# Patient Record
Sex: FEMALE | Race: OTHER
[De-identification: names, ages, dates, MRNs, and addresses within clinical notes are randomized per-mention and may not be internally consistent; named-entity substitution may affect disease eponyms.]

---

## 2018-12-07 ENCOUNTER — HOSPITAL ENCOUNTER (EMERGENCY)
Dept: HOSPITAL 25 - ED | Age: 63
Discharge: HOME | End: 2018-12-07
Payer: COMMERCIAL

## 2018-12-07 VITALS — SYSTOLIC BLOOD PRESSURE: 107 MMHG | DIASTOLIC BLOOD PRESSURE: 65 MMHG

## 2018-12-07 DIAGNOSIS — H81.10: Primary | ICD-10-CM

## 2018-12-07 DIAGNOSIS — R00.1: ICD-10-CM

## 2018-12-07 DIAGNOSIS — Z88.0: ICD-10-CM

## 2018-12-07 DIAGNOSIS — R11.10: ICD-10-CM

## 2018-12-07 LAB
BASOPHILS # BLD AUTO: 0.1 10^3/UL (ref 0–0.2)
EOSINOPHIL # BLD AUTO: 0 10^3/UL (ref 0–0.6)
HCT VFR BLD AUTO: 40 % (ref 35–47)
HGB BLD-MCNC: 13.1 G/DL (ref 12–16)
INR PPP/BLD: 1.03 (ref 0.77–1.02)
LYMPHOCYTES # BLD AUTO: 0.9 10^3/UL (ref 1–4.8)
MCH RBC QN AUTO: 29 PG (ref 27–31)
MCHC RBC AUTO-ENTMCNC: 33 G/DL (ref 31–36)
MCV RBC AUTO: 89 FL (ref 80–97)
MONOCYTES # BLD AUTO: 0.3 10^3/UL (ref 0–0.8)
NEUTROPHILS # BLD AUTO: 6 10^3/UL (ref 1.5–7.7)
NRBC # BLD AUTO: 0 10^3/UL
NRBC BLD QL AUTO: 0
PLATELET # BLD AUTO: 123 10^3/UL (ref 150–450)
RBC # BLD AUTO: 4.5 10^6/UL (ref 4–5.4)
WBC # BLD AUTO: 7.3 10^3/UL (ref 3.5–10.8)

## 2018-12-07 PROCEDURE — 81003 URINALYSIS AUTO W/O SCOPE: CPT

## 2018-12-07 PROCEDURE — 83735 ASSAY OF MAGNESIUM: CPT

## 2018-12-07 PROCEDURE — 82150 ASSAY OF AMYLASE: CPT

## 2018-12-07 PROCEDURE — 85610 PROTHROMBIN TIME: CPT

## 2018-12-07 PROCEDURE — 99284 EMERGENCY DEPT VISIT MOD MDM: CPT

## 2018-12-07 PROCEDURE — 70450 CT HEAD/BRAIN W/O DYE: CPT

## 2018-12-07 PROCEDURE — 71045 X-RAY EXAM CHEST 1 VIEW: CPT

## 2018-12-07 PROCEDURE — 85025 COMPLETE CBC W/AUTO DIFF WBC: CPT

## 2018-12-07 PROCEDURE — 93005 ELECTROCARDIOGRAM TRACING: CPT

## 2018-12-07 PROCEDURE — 86140 C-REACTIVE PROTEIN: CPT

## 2018-12-07 PROCEDURE — 83690 ASSAY OF LIPASE: CPT

## 2018-12-07 PROCEDURE — 36415 COLL VENOUS BLD VENIPUNCTURE: CPT

## 2018-12-07 PROCEDURE — 70498 CT ANGIOGRAPHY NECK: CPT

## 2018-12-07 PROCEDURE — 85730 THROMBOPLASTIN TIME PARTIAL: CPT

## 2018-12-07 PROCEDURE — 96360 HYDRATION IV INFUSION INIT: CPT

## 2018-12-07 PROCEDURE — 80053 COMPREHEN METABOLIC PANEL: CPT

## 2018-12-07 PROCEDURE — 70496 CT ANGIOGRAPHY HEAD: CPT

## 2018-12-07 NOTE — ED
Dizziness





- HPI Summary


HPI Summary: 


This patient is a 63 year old F brought in by ambulance to [facility] accompanied by 

son status post fall. The patient rates the pain 0/10 in severity. Symptoms 

aggravated by nothing. Symptoms alleviated by nothing. Patient reports vomiting 

and dizziness (room spinning).  








- History Of Current Complaint


Chief Complaint: EDDizziness


Stated Complaint: DIZZINESS


Time Seen by Provider: 12/07/18 02:54


Hx Obtained From: Patient


Onset/Duration: Still Present


Timing: Constant


Severity Initially: Mild


Severity Currently: Mild


Character: Room Spinning


Aggravating Factor(s): Nothing


Alleviating Factor(s): Nothing


Associated Signs And Symptoms: Positive: Vomiting





- Allergies/Home Medications


Allergies/Adverse Reactions: 


 Allergies











Allergy/AdvReac Type Severity Reaction Status Date / Time


 


Penicillins Allergy  Unknown Verified 10/19/18 14:24





   Reaction  





   Details  











Home Medications: 


 Home Medications





Acetaminophen [Tylenol] 325 mg PO Q4HR PRN 12/07/18 [History Confirmed 12/07/18]


Cetirizine* [ZyrTEC 10 MG TAB*] 10 mg PO DAILY 12/07/18 [History Confirmed 12/07 /18]


Pravastatin Sodium 20 mg PO DAILY 12/07/18 [History Confirmed 12/07/18]











PMH/Surg Hx/FS Hx/Imm Hx


Previously Healthy: No


Endocrine/Hematology History: Reports: Hx Diabetes


Cardiovascular History: Reports: Hx Hypertension





- Cancer History


Hx Chemotherapy: No


Hx Radiation Therapy: No


Infectious Disease History: No


Infectious Disease History: 


   Denies: Traveled Outside the [place] in Last 30 Days





- Family History


Known Family History: Positive: Other - Negative breast CA





- Social History


Occupation: Disabled


Lives: With Family


Alcohol Use: None


Hx Substance Use: No


Substance Use Type: Reports: None


Hx Tobacco Use: No


Smoking Status (MU): Never Smoked Tobacco





Review of Systems


Positive: Vomiting


Neurological: Other - Positive dizziness


All Other Systems Reviewed And Are Negative: Yes





Physical Exam





- Summary


Physical Exam Summary: 


VITAL SIGNS: Reviewed.


GENERAL: Patient is a well-developed and nourished female who is lying 

comfortable in the stretcher. Patient is not in any acute respiratory distress.


HEAD AND FACE: No signs of trauma. No ecchymosis, hematomas or skull 

depressions. No sinus tenderness.


EYES: PERRLA, EOMI x 2, No injected conjunctiva, no nystagmus.


EARS: Hearing grossly intact. Ear canals and tympanic membranes are within 

normal limits.


MOUTH: Oropharynx within normal limits.


NECK: Supple, trachea is midline, no adenopathy, no JVD, no carotid bruit, no c-

spine tenderness, neck with full ROM.


CHEST: Symmetric, no tenderness at palpation


LUNGS: Clear to auscultation bilaterally. No wheezing or crackles.


CVS: Regular rate and rhythm, S1 and S2 present, no murmurs or gallops 

appreciated.


ABDOMEN: Soft, non-tender. No signs of distention. No rebound no guarding, and 

no masses palpated. Bowel sounds are normal.


EXTREMITIES: FROM in all major joints, no edema, no cyanosis or clubbing.


NEURO: Alert and oriented x 3. No acute neurological deficits. Speech is normal 

and follows commands. pt refused to walk; however, her exam in the stretcher 

was intact. Finger to nose coordination wise is intact. Left facial droop, 

which is old.


SKIN: Dry and warm





Triage Information Reviewed: Yes


Vital Signs On Initial Exam: 


 Initial Vitals











Pulse BP Pulse Ox


 


 60   145/81   93 


 


 12/07/18 03:01  12/07/18 03:01  12/07/18 03:01











Vital Signs Reviewed: Yes





- Sin Coma Scale


Best Eye Response: 4 - Spontaneous


Best Motor Response: 6 - Obeys Commands


Best Verbal Response: 5 - Oriented


Coma Scale Total: 15





Diagnostics





- Vital Signs


 Vital Signs











  Temp Pulse Resp BP Pulse Ox


 


 12/07/18 03:09  97.5 F  56  16  145/81  95


 


 12/07/18 03:01   60   145/81  93














- Laboratory


Result Diagrams: 


 12/07/18 03:41





 12/07/18 03:41


Lab Statement: Any lab studies that have been ordered have been reviewed, and 

results considered in the medical decision making process.





- Radiology


  ** Chest XR


Radiology Interpretation Completed By: ED Physician


Summary of Radiographic Findings: CXR reveals, per ED physician, no acute 

process.





- CT


  ** Head CTA


CT Interpretation Completed By: Radiologist


Summary of CT Findings: Head CTA reveals, per radiologist, 1. No acute 

findings. No evidence of hemodynamically significant stenosis. 2. Corrugated 

appearance to a long segment of the bilateral mid and distal internal carotid 

arteries, suggestive of fibromuscular dysplasia.  Differential includes 

vasculitis. ED physician has reviewed this radiology report.





  ** Brain CT


CT Interpretation Completed By: Radiologist


Summary of CT Findings: Brain CT reveals, per radiologist, no acute 

intracranial abnormality. ED physician has reviewed this radiology report.





- EKG


  ** 0310


Cardiac Rate: NL


EKG Rhythm: Sinus Rhythm - 58 BPM


ST Segment: Non-Specific


Summary of EKG Findings: An EKG taken at 0310 reveals nml sinus rhythm at 58 

BPM with nonspecific T wave changes in the inferior leads.





Re-Evaluation





- Re-Evaluation


  ** First Eval


Re-Evaluation Time: 05:35


Change: Improved


Comment: Pt reports she feels better. She is able to sit up in the stretcher 

with mild dizziness





Dizzy Course/Dx





- Course


Course Of Treatment: This patient is a 63 year old F brought in by ambulance to 

[facility] accompanied by son status post fall. The patient rates the pain 0/10 in 

severity. Physical Exam Findings: pt refused to walk; however, her neuro exam 

in the stretcher was intact. Finger to nose coordination wise is intact. No 

nystagmus. Left facial droop, which is old. An EKG taken at 0310 reveals nml 

sinus rhythm at 58 BPM with nonspecific T wave changes in the inferior leads. 

CXR reveals, per ED physician, no acute process. Head CTA reveals, per 

radiologist, 1. No acute findings. No evidence of hemodynamically significant 

stenosis. 2. Corrugated appearance to a long segment of the bilateral mid and 

distal internal carotid arteries, suggestive of fibromuscular dysplasia.  

Differential includes vasculitis.  Brain CT reveals, per radiologist, no acute 

intracranial abnormality. Bloodwork and UA obtained. In the ED course the 

patient was given contrast, fluids, Zofran, antivert, and potassium. Pt most 

likely has benign positional vertigo. Patient will be discharged with 

prescription for Meclizine and follow up from PCP. The patient is agreeable 

with this plan.





- Diagnoses


Provider Diagnoses: 


 Benign positional vertigo








Discharge





- Sign-Out/Discharge


Documenting (check all that apply): Patient Departure - Discharge home





- Discharge Plan


Condition: Stable


Disposition: HOME


Prescriptions: 


Meclizine HCl [Motion Sickness Relief] 25 mg PO TID PRN #20 tablet


 PRN Reason: Dizziness


Patient Education Materials:  Vertigo (ED)


Referrals: 


Gabriel Patton MD [Primary Care Provider] - 2 Days


Additional Instructions: 


RETURN TO THE EMERGENCY DEPARTMENT FOR NEW OR WORSENING SYMPTOMS





- Attestation Statements


Document Initiated by Scribe: Yes


Documenting Scribe: Tahmina Goff


Provider For Whom Scribe is Documenting (Include Credential): Dr. Tex Trujillo MD


Scribe Attestation: 


I, Tahmina Goff, scribed for Dr. Tex Trujillo MD on 12/07/18 at 0610. 


Status of Scribe Document: Ready

## 2018-12-07 NOTE — XMS REPORT
Continuity of Care Document (CCD)

 Created on:2018



Patient:Erinn Glover

Sex:Female

:1955

External Reference #:[id number]





Demographics







 Address  [address] [place] [address]

 

 Home Phone  [phone]

 

 Mobile Phone  [phone]

 

 Preferred Language  es

 

 Marital Status   or 

 

 Caodaism Affiliation  Unknown

 

 Race  Unknown

 

 Additional Race(s)  Other Race

 

 Ethnic Group   or 









Author







 Name  Gabriel Patton MD

 

 Address  [address] [place] [address]









Support







 Name  Relationship  Address  Phone

 

 León Glover  Son  Unavailable  [phone]

 

 Daniel Glover  Son  Unavailable  [phone]









Care Team Providers







 Name  Role  Phone

 

 Gabriel Patton MD  Care Team Information   Unavailable









Payers







 Type  Date  Identification Numbers  Payment Provider  Subscriber

 

   Expires:  Policy Number:  [employer]  Seciliabet Gautam



   2016  [id number]    









 PayID: [id number]  [address] [address]









 [place] [address]









   Effective:  Policy Number:  Medicaid/[employer]  Seciliabet Gautam



   2017  [id number]    









 Expires: 2017  Group Name: 1 1  [address]









 PayID: [id number]  [place] [address]









     Policy Number: [id number]  [facility]  Seciliabet Gautam









 PayID: [id number]  [address] [place] [address]







Advance Directives







 Description

 

 No Information Available







Problems







 Description

 

 No Information







Family History







 Date  Family Member(s)  Problem(s)  Comments

 

   General  Diabetes  







Social History







 Type  Date  Description  Comments

 

 Birth Sex    Unknown  

 

 Marital Status      Earlier this year

 

 Lives With    Sons  

 

 Occupation    Disabled  

 

 Tobacco Use  Start: Unknown  Never Smoked Cigarettes  

 

 ETOH Use    Denies alcohol use  

 

 Recreational Drug Use    Never Used Drugs  

 

 Tobacco Use  Start: Unknown  Patient has never smoked  

 

 Smoking Status  Reviewed: 18  Patient has never smoked  







Allergies, Adverse Reactions, Alerts







 Date  Description  Reaction  Status  Severity  Comments

 

 2017  Penicillin    Active    

 

 2017  NKDA    Inactive    







Medications







 Medication  Date  Status  Form  Strength  Qnty  SIG  Indications  Ordering



                 Provider

 

 Freestyle  /  Active  Misc    120un  use 2 times a    Gabriel



 Lancets  2018        its  day as    Pooja



             instructed    , MD



             for blood    



             glucose    



             testing    

 

 Freestyle Lite  /  Active  Strips    200un  use to test    Gabriel



 Test  2018        its  blood sugar    Pooja



             twice daily    , MD



             dx code e11.9    

 

 Alcohol Swabs  /  Active  Pads  70%  100un  use for    Gabriel



   2018        its  fingersticks    MD Pooja

 

 Vitamin B12  /  Active  Tablets  3000mcg  90tab  1 tab    Gabriel



   2018    Sub    s  sublingually    Heetderks



             per day    , MD

 

 Glucometer  /  Active        For testing  E11.65  Gabriel



   2018          blood sugar.    Pooja Mendoza MD



             controlled    



             diabetes.    



             Please    



             dispense    



             glucometer    



             kit covered    



             by insurance.    

 

 Ramipril  /  Active  Capsules  10mg  30cap  take 1  Z00.8  Gabriel



           s  capsule by    Heetsohan



             mouth once    , MD



             daily    

 

 Zyrtec Allergy  /  Active  Tablets  10mg  30tab  1 by mouth  R05  Gabriel



           s  every day    MD Pooja

 

 Calcium/D  /  Active      60uni  Take 1 Tablet    Gabriel



 500MG Tab  2016        ts  By Mouth    Lemueletderks



             Twice Daily    , MD

 

 Tylenol  /  Active  Tablets  325mg  60tab  2 tab by    Gabriel



           s  mouth three    Heetderks



             times a day    , MD



             as needed    



             for headache    

 

 Oyster Shell  10/24/  Active  Tablets  500-400mg  60tab  1 tab by    Gabriel



 Calcium + D3  2016      -Unit  s  mouth twice a    Heetderks



             day    , MD

 

 Pravastatin  10/24/  Active  Tablets  20mg  90tab  Take One    Gabriel



 Sodium          s  Tablet By    Heetderks



             Mouth AT    , MD



             Bedtime    

 

 Pantoprazole  10/24/  Active  Tablets DR  40mg  30tab  Take 1 Tablet    Gabriel



 Sodium          s  By Mouth Once    Heetderks



             Daily    , MD

 

 Torsemide  /  Active  Tablets  10mg  30tab  1 by mouth    Gabriel



   0000        s  every day as    Pooja



             needed    , MD

 

 Metformin HCL  /  Active  Tablets  1000mg  180ta  take one    Gabriel



   0000        bs  tablet by    Heetsohan



             mouth twice    , MD



             daily    

 

                 

 

 Ramipril  01/15/  Hx  Capsules  10mg  60cap  mellissa 2  Z00.8  Gabriel



   2018 -        s  capsules by    Pooja



   01/16/          mouth daily    , MD



   2018          for high    



             blood    



             pressure    

 

 Ramipril  /  Hx  Capsules  10mg  60cap  Take Two  Z00.8  Gabriel



   2016 -        s  Capsules By    Pooja



   01/15/          Mouth Once    , MD



             Daily    

 

 Ramipril  10/24/  Hx  Capsules  10mg  30cap  1 tab by  Z00.8  Gabriel



   2016 -        s  mouth daily    Pooja



   /              , MD



                 

 

 Tessalon  10/24/  Hx  Capsules  100mg  45cap  take 1    Gabriel



 Perles  2016 -        s  capsule by    Pooja



   /          mouth 3 times    , MD



             per day as    



             needed for    



             cough    

 

 Oyster Shell  /  Hx            Unknown



 Calcium 500+D  0000 -              



   10/24/              



   2016              







Medications Administered in Office







 Medication  Date  Status  Form  Strength  Qnty  SIG  Indications  Ordering



                 Provider

 

 Vitamin B-12  08/10/2  Administered  Injection          Lab and



 Injection-To  018              Office



 1000mcg                Services

 

 Vitamin B-12    Administered  Injection          Lab and



 Injection-To  018              Office



 1000mcg                Services

 

 Vitamin B-12    Administered  Injection          Lab and



 Injection-To  018              Office



 1000mcg                Services

 

 Vitamin B-12    Administered  Injection          Gabriel



 Injection-To  azam Landrum MD

 

 Vitamin B-12    Administered  Injection          Lab and



 Injection-To  017              Office



 1000mcg                Services

 

 Vitamin B-12    Administered  Injection          Gabriel



 Injection-To  azam Vila MD

 

 Vitamin B-12  10/24/2  Administered  Injection          Gabriel



 Injection-To  azam Vila MD







Immunizations







 CPT Code  Status  Date  Vaccine  Lot #

 

 [id number]  Given  2018  Influenza Virus Vaccine, Quadrivalent, 3 Yr >  
[id number]



       Quad, Preserv Free  

 

 29684  Given  2017  Influenza Virus Vaccine, Quadrivalent, 3 Yr >  
[id number]



       Quad, Preserv Free  

 

 [id number]  Given  2016  Tdap (Adacel)  [id number]

 

 10057  Given  10/24/2016  Influenza Virus Vaccine, Quadrivalent, 3 Yr >  
[id number]



       Quad, Preserv Free  

 

 93025  Given  10/07/2015  Influenza Virus Vaccine, Quadrivalent, 3 Yr >  



       Quad, Preserv Free  

 

 08906  Given  2008  Pneumovax 23 (PPSV23) 65+ years or high risk 2 to  



       64 year old  

 

 25153  Given  2008  DT (Pediatric)  







Vital Signs







 Date  Vital  Result  Comment

 

 2018  2:36pm  Weight  166.00 lb  









 Weight  75.298 kg  

 

 BP Systolic  124 mmHg  

 

 BP Diastolic  80 mmHg  

 

 Heart Rate  76 /min  

 

 Body Temperature  97.9 F  

 

 Respiratory Rate  16 /min  

 

 Height  63.5 inches  5'3.50"

 

 BMI (Body Mass Index)  28.9 kg/m2  

 

 O2 % BldC Oximetry  96 %  









 2018  4:21pm  Weight  171.00 lb  









 Weight  77.566 kg  

 

 BP Systolic  138 mmHg  

 

 BP Diastolic  85 mmHg  

 

 Heart Rate  64 /min  

 

 Body Temperature  98.7 F  

 

 Respiratory Rate  20 /min  









 2018  1:11pm  Weight  178.00 lb  









 Weight  80.741 kg  

 

 BP Systolic  126 mmHg  

 

 BP Diastolic  78 mmHg  

 

 Heart Rate  74 /min  

 

 Body Temperature  97.9 F  

 

 Respiratory Rate  18 /min  

 

 O2 % BldC Oximetry  97 %  









 2018 11:43am  Weight  179.00 lb  









 Weight  81.194 kg  

 

 BP Systolic  118 mmHg  

 

 BP Diastolic  80 mmHg  

 

 Heart Rate  74 /min  

 

 Body Temperature  97.9 F  

 

 Respiratory Rate  14 /min  

 

 O2 % BldC Oximetry  98 %  









 2017  3:03pm  Weight  175.00 lb  









 Weight  79.380 kg  

 

 BP Systolic  112 mmHg  

 

 BP Diastolic  78 mmHg  

 

 Heart Rate  76 /min  

 

 Body Temperature  98.6 F  

 

 Respiratory Rate  16 /min  









 2017  2:07pm  Weight  172.00 lb  









 Weight  78.019 kg  

 

 BP Systolic  122 mmHg  

 

 BP Diastolic  72 mmHg  

 

 Heart Rate  64 /min  

 

 Body Temperature  97.5 F  

 

 Respiratory Rate  16 /min  









 2016  9:48am  Weight  163.00 lb  









 Weight  73.937 kg  

 

 BP Systolic  140 mmHg  

 

 BP Diastolic  68 mmHg  

 

 Heart Rate  76 /min  

 

 Height  63.5 inches  5'3.50"

 

 BMI (Body Mass Index)  28.4 kg/m2  









 10/24/2016  3:19pm  Weight  161.00 lb  









 Weight  73.030 kg  

 

 BP Systolic  106 mmHg  

 

 BP Diastolic  64 mmHg  

 

 Heart Rate  72 /min  

 

 Body Temperature  97.9 F  

 

 Respiratory Rate  20 /min  

 

 Height  63 inches  5'3"

 

 BMI (Body Mass Index)  28.5 kg/m2  







Results







 Test  Date  Facility  Test  Result  H/L  Range  Note

 

 Laboratory test  2018  [facility]  Hemoglobin A1c  
5.5 %    4.0-5.6  1



 finding    [phone]  (Glyco HGB)        

 

 CBC Auto Diff  2018  [facility]  White Blood  6.8 
10^3/uL    3.5-10.8  



     [phone]  Count        









 Red Blood Count  4.38 10^6/uL    4.00-5.40  

 

 Hemoglobin  12.9 g/dL    12.0-16.0  

 

 Hematocrit  39 %    35-47  

 

 Mean Corpuscular Volume  89 fL    80-97  

 

 Mean Corpuscular Hemoglobin  29 pg    27-31  

 

 Mean Corpuscular HGB Conc  33 g/dL    31-36  

 

 Red Cell Distribution Width  15 %    10.5-15  

 

 Platelet Count  122 10^3/uL  Low  150-450  

 

 Mean Platelet Volume  12.0 fL  High  7.4-10.4  

 

 Abs Neutrophils  3.9 10^3/uL    1.5-7.7  

 

 Abs Lymphocytes  2.3 10^3/uL    1.0-4.8  

 

 Abs Monocytes  0.5 10^3/uL    0-0.8  

 

 Abs Eosinophils  0.1 10^3/uL    0-0.6  

 

 Abs Basophils  0 10^3/uL    0-0.2  

 

 Abs Nucleated RBC  0 10^3/uL      

 

 Granulocyte %  56.9 %      

 

 Lymphocyte %  33.5 %      

 

 Monocyte %  7.3 %      

 

 Eosinophil %  1.6 %      

 

 Basophil %  0.7 %      

 

 Nucleated Red Blood Cells %  0.1      









 Comp Metabolic Panel  2018  [facility]  Sodium  
144 mmol/L    135-145  



     [phone]          









 Potassium  4.3 mmol/L    3.5-5.0  

 

 Chloride  109 mmol/L    101-111  

 

 Co2 Carbon Dioxide  29 mmol/L    22-32  

 

 Anion Gap  6 mmol/L    2-11  

 

 Glucose  86 mg/dL      

 

 Blood Urea Nitrogen  9 mg/dL    6-24  

 

 Creatinine  0.65 mg/dL    0.51-0.95  

 

 BUN/Creatinine Ratio  13.8    8-20  

 

 Calcium  9.9 mg/dL    8.6-10.3  

 

 Total Protein  6.8 g/dL    6.4-8.9  

 

 Albumin  4.1 g/dL    3.2-5.2  

 

 Globulin  2.7 g/dL    2-4  

 

 Albumin/Globulin Ratio  1.5    1-3  

 

 Total Bilirubin  0.40 mg/dL    0.2-1.0  

 

 Alkaline Phosphatase  82 U/L      

 

 Alt  41 U/L    7-52  

 

 Ast  38 U/L    13-39  

 

 Egfr Non-  92.4    >60  

 

 Egfr   111.8    >60  2









 Laboratory test  08/10/2018  [facility]  Vitamin B12  
412 pg/mL    180-914  3



 finding    [phone]          

 

 Liver Function  2018  [facility]  Direct  0.10 mg/
dL    0.03-0.18  



 Panel    [phone]  Bilirubin        









 Indirect Bilirubin  0.4 mg/dL    0.3-1.0  









 Laboratory test  2018  [facility]  Vitamin B12  
182 pg/mL    180-914  4



 finding    [phone]          









 Vitamin D Total 25(Oh)  42.2 ng/mL    20-50  5

 

 TSH (Thyroid Stim Horm)  4.54 mcIU/mL    0.34-5.60  6

 

 Free T4 (Free Thyroxine)  0.87 ng/dL    0.61-1.12  7

 

 T3 Total  1.35 ng/mL    0.87-1.78  8

 

 Thyroperoxidase AB  1.48 IU/mL    <9  9









 Laboratory test  2018  [facility]  Cytology  SEE 
RESULT      10



 finding    [phone]    BELOW      

 

 Comp Metabolic  2018  [facility]  Sodium  144 mmol/
L    139-145  



 Panel    [phone]          









 Potassium  4.0 mmol/L    3.5-5.0  

 

 Chloride  105 mmol/L    101-111  

 

 Co2 Carbon Dioxide  28 mmol/L    22-32  

 

 Anion Gap  11 mmol/L    2-11  

 

 Glucose  88 mg/dL      

 

 Blood Urea Nitrogen  8 mg/dL    6-24  

 

 Creatinine  0.70 mg/dL    0.51-0.95  

 

 BUN/Creatinine Ratio  11.4    8-20  

 

 Calcium  9.5 mg/dL    8.6-10.3  

 

 Total Protein  6.8 g/dL    6.4-8.9  

 

 Albumin  3.9 g/dL    3.2-5.2  

 

 Globulin  2.9 g/dL    2-4  

 

 Albumin/Globulin Ratio  1.3    1-3  

 

 Total Bilirubin  0.50 mg/dL    0.2-1.0  

 

 Alkaline Phosphatase  96 U/L      

 

 Alt  74 U/L  High  7-52  

 

 Ast  64 U/L  High  13-39  

 

 Egfr Non-  85.1    >60  

 

 Egfr   109.4    >60  11









 CBC Auto Diff  2018  [facility]  White Blood  6.2 
10^3/uL    3.5-10.8  



     [phone]  Count        









 Red Blood Count  4.42 10^6/uL    4.0-5.4  

 

 Hemoglobin  12.8 g/dL    12.0-16.0  

 

 Hematocrit  39 %    35-47  

 

 Mean Corpuscular Volume  88 fL    80-97  

 

 Mean Corpuscular Hemoglobin  29 pg    27-31  

 

 Mean Corpuscular HGB Conc  33 g/dL    31-36  

 

 Red Cell Distribution Width  16 %  High  10.5-15  

 

 Platelet Count  119 10^3/uL  Low  150-450  

 

 Mean Platelet Volume  12.1 um3  High  7.4-10.4  

 

 Abs Neutrophils  3.6 10^3/uL    1.5-7.7  

 

 Abs Lymphocytes  2.0 10^3/uL    1.0-4.8  

 

 Abs Monocytes  0.4 10^3/uL    0-0.8  

 

 Abs Eosinophils  0.2 10^3/uL    0-0.6  

 

 Abs Basophils  0 10^3/uL    0-0.2  

 

 Abs Nucleated RBC  0 10^3/uL      

 

 Granulocyte %  58.1 %    38-83  

 

 Lymphocyte %  31.2 %    25-47  

 

 Monocyte %  7.1 %  High  0-7  

 

 Eosinophil %  2.8 %    0-6  

 

 Basophil %  0.8 %    0-2  

 

 Nucleated Red Blood Cells %  0      









 Urine Microalbumin  2018  [facility]  Ur 
Microalbumin  20.4 mg/L      



 Random    [phone]  (mg/L)        









 Urine Creatinine  102.08 mg/dL      

 

 Urine Microalbumin/Creatinine  19.9 ug/mg    <31  









 CBC Auto Diff  2018  [facility]  White Blood  6.3 
10^3/uL    3.5-10.8  



     [phone]  Count        









 Red Blood Count  4.46 10^6/uL    4.0-5.4  

 

 Hemoglobin  12.9 g/dL    12.0-16.0  

 

 Hematocrit  40 %    35-47  

 

 Mean Corpuscular Volume  90 fL    80-97  

 

 Mean Corpuscular Hemoglobin  29 pg    27-31  

 

 Mean Corpuscular HGB Conc  32 g/dL    31-36  

 

 Red Cell Distribution Width  15 %    10.5-15  

 

 Platelet Count  107 10^3/uL  Low  150-450  

 

 Mean Platelet Volume  12.2 um3  High  7.4-10.4  

 

 Abs Neutrophils  3.4 10^3/uL    1.5-7.7  

 

 Abs Lymphocytes  2.2 10^3/uL    1.0-4.8  

 

 Abs Monocytes  0.5 10^3/uL    0-0.8  

 

 Abs Eosinophils  0.1 10^3/uL    0-0.6  

 

 Abs Basophils  0 10^3/uL    0-0.2  

 

 Abs Nucleated RBC  0 10^3/uL      

 

 Granulocyte %  54.4 %    38-83  

 

 Lymphocyte %  35.3 %    25-47  

 

 Monocyte %  7.6 %  High  0-7  

 

 Eosinophil %  2.0 %    0-6  

 

 Basophil %  0.7 %    0-2  

 

 Nucleated Red Blood Cells %  0.1      









 Comp Metabolic Panel  2018  [facility]  Sodium  
142 mmol/L    139-145  



     [phone]          









 Potassium  4.0 mmol/L    3.5-5.0  

 

 Chloride  103 mmol/L    101-111  

 

 Co2 Carbon Dioxide  28 mmol/L    22-32  

 

 Anion Gap  11 mmol/L    2-11  

 

 Glucose  78 mg/dL      

 

 Blood Urea Nitrogen  12 mg/dL    6-24  

 

 Creatinine  0.69 mg/dL    0.51-0.95  

 

 BUN/Creatinine Ratio  17.4    8-20  

 

 Calcium  9.6 mg/dL    8.6-10.3  

 

 Total Protein  6.8 g/dL    6.4-8.9  

 

 Albumin  3.9 g/dL    3.2-5.2  

 

 Globulin  2.9 g/dL    2-4  

 

 Albumin/Globulin Ratio  1.3    1-3  

 

 Total Bilirubin  0.40 mg/dL    0.2-1.0  

 

 Alkaline Phosphatase  92 U/L      

 

 Alt  75 U/L  High  7-52  

 

 Ast  92 U/L  High  13-39  

 

 Egfr Non-  86.5    >60  

 

 Egfr   111.2    >60  12









 Laboratory test  2018  [facility]  Hemoglobin A1c  
5.8 %  High  4.0-5.6  13



 finding    [phone]  (Glyco HGB)        

 

 Lipid Profile  2018  [facility]  Triglycerides  
133      14



 (Trig/Chol/HDL)    [phone]    mg/dL      









 Cholesterol  142 mg/dL      15

 

 HDL Cholesterol  36.6 mg/dL      16

 

 LDL Cholesterol  79 mg/dL      17









 Laboratory test  2017  [facility]  Hemoglobin A1c  
5.8 %    Less than  18



 finding    [phone]  (Glyco HGB)      6.0  









 Hepatitis C Antibody  Nonreactive    Nonreactive  19









 Lipid Profile  2017  [facility]  Triglycerides  
143 mg/dL      20



 (Trig/Chol/HDL)    [phone]          









 Cholesterol  160 mg/dL      21

 

 HDL Cholesterol  38.4 mg/dL      22

 

 LDL Cholesterol  93 mg/dL      23









 Urine Microalbumin  2016  [facility]  Urine 
Creatinine  80.43 mg/dL      24



 Random    [phone]          









 Ur Microalbumin (mg/L)  15.8 mg/L      

 

 Urine Microalbumin/Creatinine  19.6 ug/mg    <31  









 Laboratory test  10/24/2016  [facility]  Hemoglobin A1c  
5.7 %    Less than  25



 finding    [phone]  (Glyco HGB)      6.0  

 

 CBC Auto Diff  10/24/2016  [facility]  White Blood  8.6 
   3.5-10.8  



     [phone]  Count  10^3/uL      









 Red Blood Count  5.16 10^6/uL    4.0-5.4  

 

 Hemoglobin  15.2 g/dL    12.0-16.0  

 

 Hematocrit  47 %    35-47  

 

 Mean Corpuscular Volume  91 fL    80-97  

 

 Mean Corpuscular Hemoglobin  30 pg    27-31  

 

 Mean Corpuscular HGB Conc  32 g/dL    31-36  

 

 Red Cell Distribution Width  14 %    10.5-15  

 

 Platelet Count  140 10^3/uL  Low  150-450  

 

 Mean Platelet Volume  12 um3  High  7.4-10.4  

 

 Abs Neutrophils  5.5 10^3/uL    1.5-7.7  

 

 Abs Lymphocytes  2.4 10^3/uL    1.0-4.8  

 

 Abs Monocytes  0.6 10^3/uL    0-0.8  

 

 Abs Eosinophils  0.1 10^3/uL    0-0.6  

 

 Abs Basophils  0 10^3/uL    0-0.2  

 

 Abs Nucleated RBC  0 10^3/uL      

 

 Granulocyte %  64.1 %    38-83  

 

 Lymphocyte %  28.0 %    25-47  

 

 Monocyte %  6.4 %    1-9  

 

 Eosinophil %  0.9 %    0-6  

 

 Basophil %  0.6 %    0-2  

 

 Nucleated Red Blood Cells %  0      









 Comp Metabolic Panel  10/24/2016  [facility]  Sodium  
141 mmol/L    133-145  



     [phone]          









 Potassium  5.0 mmol/L    3.5-5.0  

 

 Chloride  103 mmol/L    101-111  

 

 Co2 Carbon Dioxide  30 mmol/L    22-32  

 

 Anion Gap  8 mmol/L    2-11  

 

 Glucose  90 mg/dL      

 

 Blood Urea Nitrogen  15 mg/dL    6-24  

 

 Creatinine  0.88 mg/dL    0.51-0.95  

 

 BUN/Creatinine Ratio  17.0    8-20  

 

 Calcium  10.0 mg/dL    8.6-10.3  

 

 Total Protein  7.4 g/dL    6.4-8.9  

 

 Albumin  4.1 g/dL    3.2-5.2  

 

 Globulin  3.3 g/dL    2-4  

 

 Albumin/Globulin Ratio  1.2    1-3  

 

 Total Bilirubin  0.60 mg/dL    0.2-1.0  

 

 Alkaline Phosphatase  83 U/L      

 

 Alt  31 U/L    7-52  

 

 Ast  24 U/L    13-39  

 

 Egfr Non-  65.5    >60  

 

 Egfr   84.3    >60  26









 Lipid Profile  10/24/2016  [facility]  Triglycerides  
117 mg/dL      27



 (Trig/Chol/HDL)    [phone]          









 Cholesterol  128 mg/dL      28

 

 HDL Cholesterol  30.1 mg/dL      29

 

 LDL Cholesterol  75 mg/dL      30









 Laboratory test  10/24/2016  [facility]  Vitamin B12  > 
1450  High  180-914  31



 finding    [phone]    pg/mL      









 1  Therapeutic target for the treatment of diabetes



   mellitus patients is <7% HBA1C, and in selective



   patients <6.0%.  Please refer to American Diabetes



   Association diabetic care guidelines for further



   information.

 

 2  *******Because ethnic data is not always readily available,



   this report includes an eGFR for both -Americans and



   non- Americans.****



   The National Kidney Disease Education Program (NKDEP) does



   not endorse the use of the MDRD equation for patients that



   are not between the ages of 18 and 70, are pregnant, have



   extremes of body size, muscle mass, or nutritional status,



   or are non- or non-.



   According to the National Kidney Foundation, irrespective of



   diagnosis, the stage of the disease is based on the level of



   kidney function:



   Stage Description                      GFR(mL/min/1.73 m(2))



   1     Kidney damage with normal or decreased GFR       90



   2     Kidney damage with mild decrease in GFR          60-89



   3     Moderate decrease in GFR                         30-59



   4     Severe decrease in GFR                           15-29



   5     Kidney failure                       <15 (or dialysis)

 

 3  Normal Range 180 to 914



   Indeterminate Range 145 to 180



   Deficient Range  <145

 

 4  Normal Range 180 to 914



   Indeterminate Range 145 to 180



   Deficient Range  <145

 

 5  [id number]

 

 6  [id number]

 

 7  [id number]

 

 8  [id number]

 

 9  [id number]

 

 10  SEE RESULT BELOW



   -----------------------------------------------------------------------------
---------------



   Name:  GAUTAMLAKSHMIEUGENIO             : 1955    Attend Dr: 
Amberly Ordonez NP



   Acct:  [id number]  Unit: [id number]  AGE: 62            Location:  [facility]



   Re18                        SEX: F             Status:    REG REF



   -----------------------------------------------------------------------------
---------------



   



   SPEC: [id number]            ALEKSANDER:       SUBM DR: Amberly Ordonez NP



   REQ:  [id number]             RECD: 



   STATUS: SOUT



   _



   ORDERED:  TP IMAGE ANALYS, HPV/Thin Prep, HPV 16/18 GENE



   Negative for Intraepithelial lesion or Malignancy



   A. Vaginal



   Specimen Adequacy:



   Satisfactory of evaluation



   Patient Information:



   HPV: High risk HPV RNA testing regardless of pap results.



   HPV 16/18 Genotype Reflex



   Actual Specimen Date:         18



   Other Pertinent History:     No History Given



   



   -----------------------------------------------------------------------------
---------------



   Date     Time Test              Result   Flag (u) Normal Range



   18 1617 @ HPV RNA RFLX GE Negative          Negative



   @



   @               The high-risk HPV types detected by the assay include: 16,



   @               18, 31, 33, 35, 39, 45, 51, 52, 56, 58, 59, 66, and 68.



   -----------------------------------------------------------------------------
---------------



   



   



   Signed by and Reported on: __________              TANIA Marvin(ASCP)  [id number]



   



   This Pap test was evaluated with the assistance of the [employer] Test Imaging 
System. Due to



   cytologic findings at the imager microscope, comprehensive manual 
rescreening by a



   Cytotechnologist may be required.



   The Pap Smear is a screening test designed to aid in the detection of 
premalignant and



   malignant conditions of the uterine cervix.  It is not a diagnostic 
procedure and should



   not be used as the sole means of detecting cervical cancer.  Both false-
positive and false-



   negative reports do occur.  Depending on your risk status, a Pap smear 
should be obtained



   and evaluated every 1-3 years.



   



   -----------------------------------------------------------------------------
---------------



   



   



   



   



   



   



   



   ** END OF REPORT **



   



   DEPARTMENT OF PATHOLOGY,  [address] [place] [address]



   Phone # [phone]      Fax #[phone]



   Adonay Brown M.D. Director     [facility] # [id number]

 

 11  *******Because ethnic data is not always readily available,



   this report includes an eGFR for both -Americans and



   non- Americans.****



   The National Kidney Disease Education Program (NKDEP) does



   not endorse the use of the MDRD equation for patients that



   are not between the ages of 18 and 70, are pregnant, have



   extremes of body size, muscle mass, or nutritional status,



   or are non- or non-.



   According to the National Kidney Foundation, irrespective of



   diagnosis, the stage of the disease is based on the level of



   kidney function:



   Stage Description                      GFR(mL/min/1.73 m(2))



   1     Kidney damage with normal or decreased GFR       90



   2     Kidney damage with mild decrease in GFR          60-89



   3     Moderate decrease in GFR                         30-59



   4     Severe decrease in GFR                           15-29



   5     Kidney failure                       <15 (or dialysis)

 

 12  *******Because ethnic data is not always readily available,



   this report includes an eGFR for both -Americans and



   non- Americans.****



   The National Kidney Disease Education Program (NKDEP) does



   not endorse the use of the MDRD equation for patients that



   are not between the ages of 18 and 70, are pregnant, have



   extremes of body size, muscle mass, or nutritional status,



   or are non- or non-.



   According to the National Kidney Foundation, irrespective of



   diagnosis, the stage of the disease is based on the level of



   kidney function:



   Stage Description                      GFR(mL/min/1.73 m(2))



   1     Kidney damage with normal or decreased GFR       90



   2     Kidney damage with mild decrease in GFR          60-89



   3     Moderate decrease in GFR                         30-59



   4     Severe decrease in GFR                           15-29



   5     Kidney failure                       <15 (or dialysis)

 

 13  Therapeutic target for the treatment of diabetes



   mellitus patients is <7% HBA1C, and in selective



   patients <6.0%.  Please refer to American Diabetes



   Association diabetic care guidelines for further



   information.

 

 14  Desirable: <150



   Borderline High: 150-199



   High: 200-499



   Very High: >500

 

 15  Desirable: <200



   Borderline High: 200-239



   High: >239

 

 16  Low: <40



   Desirable: 40-60



   High: >60

 

 17  Desirable: <100



   Near Optimal: 100-129



   Borderline High: 130-159



   High: 160-189



   Very High: >189

 

 18  Therapeutic target for the treatment of diabetes



   Mellitus patients is <7% HBA1C, and in selective



   patients <6.0%.Please refer to American Diabetes



   Association Diabetic care guidelines for further



   information.

 

 19  [id number]

 

 20  Desirable <150



   Borderline high 150-199



   High 200-499



   Very High >500

 

 21  Desirable <200



   Borderline high 200-239



   High >239

 

 22  Low <40



   Desirable: 40-60



   High: >60

 

 23  Desirable: <100 mg/dL



   Near Optimal: 100-129 mg/dL



   Borderline High: 130-159 mg/dL



   High: 160-189 mg/dL



   Very High: >189 mg/dL

 

 24  [id number]

 

 25  Therapeutic target for the treatment of diabetes



   Mellitus patients is <7% HBA1C, and in selective



   patients <6.0%.Please refer to American Diabetes



   Association Diabetic care guidelines for further



   information.

 

 26  *******Because ethnic data is not always readily available,



   this report includes an eGFR for both -Americans and



   non- Americans.****



   The National Kidney Disease Education Program (NKDEP) does



   not endorse the use of the MDRD equation for patients that



   are not between the ages of 18 and 70, are pregnant, have



   extremes of body size, muscle mass, or nutritional status,



   or are non- or non-.



   According to the National Kidney Foundation, irrespective of



   diagnosis, the stage of the disease is based on the level of



   kidney function:



   Stage Description                      GFR(mL/min/1.73 m(2))



   1     Kidney damage with normal or decreased GFR       90



   2     Kidney damage with mild decrease in GFR          60-89



   3     Moderate decrease in GFR                         30-59



   4     Severe decrease in GFR                           15-29



   5     Kidney failure                       <15 (or dialysis)

 

 27  Desirable <150



   Borderline high 150-199



   High 200-499



   Very High >500

 

 28  Desirable <200



   Borderline high 200-239



   High >239

 

 29  Low <40



   Desirable: 40-60



   High: >60

 

 30  Desirable: <100 mg/dL



   Near Optimal: 100-129 mg/dL



   Borderline High: 130-159 mg/dL



   High: 160-189 mg/dL



   Very High: >189 mg/dL

 

 31  Normal Range 180 to 914



   Indeterminate Range 145 to 180



   Deficient Range  <145







Procedures







 Date  Code  Description  Status

 

 08/10/2018  13403  Therapeutic,Prophylactic,Or Diagnostic Inj,SC/Im  Completed



     Specify Drug  

 

 2018  [id number]  Therapeutic,Prophylactic,Or Diagnostic Inj,SC/Im  Completed



     Specify Drug  

 

 2018  [id number]  Therapeutic,Prophylactic,Or Diagnostic Inj,SC/Im  Completed



     Specify Drug  

 

 2018  [id number]  EKG, at Least 12 Leads w/Interpretation and Report  
Completed

 

 2018  [id number]  Mammogram  Completed

 

 2017  [id number]  Therapeutic,Prophylactic,Or Diagnostic Inj,SC/Im  Completed



     Specify Drug  

 

 2017  [id number]  Therapeutic,Prophylactic,Or Diagnostic Inj,SC/Im  Completed



     Specify Drug  

 

 2016  [id number]  Therapeutic,Prophylactic,Or Diagnostic Inj,SC/Im  Completed



     Specify Drug  

 

 10/24/2016  [id number]  Therapeutic,Prophylactic,Or Diagnostic Inj,SC/Im  Completed



     Specify Drug  

 

 06/15/2015  [id number]  Colonoscopy  Completed







Encounters







 Type  Date  Location  Provider  Dx  Diagnosis

 

 Office Visit  2018  Main Office  Gabriel Patton,  E11.65  Type 2 
diabetes



   4:00p    MD    mellitus with



           hyperglycemia

 

 Office Visit  2018  Main Office  Amberly Ordonez  Z00.00  Encntr for 
general



   1:30p    NP    adult medical exam w/o



           abnormal findings









 R53.83  Other fatigue

 

 Z12.4  Encounter for screening for malignant neoplasm of cervix

 

 E11.65  Type 2 diabetes mellitus with hyperglycemia

 

 I10  Essential (primary) hypertension









 Office Visit  2018 11:45a  Main Office  Gabriel Patton  M65.311  
Trigger thumb,



       MD    right thumb









 M65.4  Radial styloid tenosynovitis [de Quervain]

 

 E11.65  Type 2 diabetes mellitus with hyperglycemia









 Office Visit  2017  3:00p  Main Office  Gabriel Patton  M65.311  
Trigger thumb,



       MD    right thumb









 M65.4  Radial styloid tenosynovitis [de Quervain]

 

 Z23  Encounter for immunization









 Office Visit  2017  2:00p  Main Office  Gabriel Patton  E11.65  Type 
2 diabetes



       MD    mellitus with



           hyperglycemia









 Z11.59  Encounter for screening for other viral diseases

 

 R05  Cough

 

 R53.83  Other fatigue

 

 E53.8  Deficiency of other specified B group vitamins









 Office Visit  2016  9:00a  Main Office  Gabriel Patton  Z00.8  
Encounter for



       MD    other general



           examination









 Z23  Encounter for immunization

 

 E53.8  Deficiency of other specified B group vitamins









 Office Visit  10/24/2016  3:30p  Main Office  Gabriel Patton  E11.65  Type 
2 diabetes



       MD    mellitus with



           hyperglycemia









 E53.8  Deficiency of other specified B group vitamins

 

 E78.00  Pure hypercholesterolemia, unspecified

 

 I10  Essential (primary) hypertension

 

 R60.9  Edema, unspecified

 

 Z23  Encounter for immunization







Plan of Treatment

Future Appointment(s):2019  3:30 pm - Gabriel Patton MD at Main 
[id number] - Gabriel Patton MDE11.65 Type 2 diabetes mellitus with 
hyperglycemiaComments:Primary measures proven to improve life expectancy:Smoking
- never smokerBlood pressure- WNL on ramiprilMetformin- On metforminStatin-
Takes a [id number]- Well controlled, below 6. Checking annuallySecondary measures
:-Diabetic foot exam annually 3/27/18-Diabetic retinopathy screen annually 
Needs this, advised.Saw an eye doc this year.-Microalbumin yearly  3/27/
18Tertiary measures:-HBV vaccine-Pneumovax -Nutritional [id number]-
Alcohol use screening-exercise program